# Patient Record
Sex: FEMALE | Race: WHITE | URBAN - METROPOLITAN AREA
[De-identification: names, ages, dates, MRNs, and addresses within clinical notes are randomized per-mention and may not be internally consistent; named-entity substitution may affect disease eponyms.]

---

## 2024-06-03 ENCOUNTER — OFFICE (OUTPATIENT)
Dept: URBAN - METROPOLITAN AREA CLINIC 19 | Facility: CLINIC | Age: 74
End: 2024-06-03
Payer: COMMERCIAL

## 2024-06-03 VITALS
OXYGEN SATURATION: 98 % | HEART RATE: 70 BPM | DIASTOLIC BLOOD PRESSURE: 74 MMHG | WEIGHT: 150 LBS | SYSTOLIC BLOOD PRESSURE: 135 MMHG | HEIGHT: 66 IN

## 2024-06-03 DIAGNOSIS — R19.7 DIARRHEA, UNSPECIFIED: ICD-10-CM

## 2024-06-03 DIAGNOSIS — R10.13 EPIGASTRIC PAIN: ICD-10-CM

## 2024-06-03 DIAGNOSIS — Z79.1 LONG TERM (CURRENT) USE OF NON-STEROIDAL ANTI-INFLAMMATORIES: ICD-10-CM

## 2024-06-03 DIAGNOSIS — R11.0 NAUSEA: ICD-10-CM

## 2024-06-03 PROCEDURE — 99204 OFFICE O/P NEW MOD 45 MIN: CPT

## 2024-06-03 RX ORDER — POLYETHYLENE GLYCOL 3350, SODIUM SULFATE, SODIUM CHLORIDE, POTASSIUM CHLORIDE, ASCORBIC ACID, SODIUM ASCORBATE 140-9-5.2G
KIT ORAL
Qty: 1 | Refills: 0 | Status: ACTIVE
Start: 2024-06-03

## 2024-06-03 NOTE — SERVICEHPINOTES
73-year-old female is here with complaints of nausea.  Reports occasional episodes for several years of nausea without vomiting.  She does have a history of vertigo and states her nausea is sometimes is related to episodes of dizziness.  She does not vape or smoke marijuana.  She is postmenopausal.  Takes Advil multiple times per week for arthritic pain.  Denies any acid reflux or trouble swallowing.  Does not drink any carbonated beverages or sodas or alcohol.  Reports formed bowel movements with occasional loose /pasty stool.  She has not been on any recent antibiotics.  Informs me she had C. diff 12 years ago. She avoids dairy.  Does not have any abdominal pain when she eats red meat.  Complains of mild dull epigastric abdominal pain which is exacerbated after eating. Reports severity 4/10. She has never had an EGD or colonoscopy.  First cousin with colon cancer.  Brother with ulcerative colitis.  No family history of colon polyps.  No cardiac issues, not taking any blood thinners or weight loss medications.  Outside blood work from her PCP's office in April 2024 revealed normal CBC, CMP, TSH, free T4, and magnesium.  Total cholesterol 203, rest of lipid panel was normal.

## 2024-06-11 ENCOUNTER — OFFICE (OUTPATIENT)
Dept: URBAN - METROPOLITAN AREA CLINIC 19 | Facility: CLINIC | Age: 74
End: 2024-06-11

## 2024-06-11 DIAGNOSIS — R11.0 NAUSEA: ICD-10-CM

## 2024-06-11 PROCEDURE — 76700 US EXAM ABDOM COMPLETE: CPT | Mod: TC

## 2024-09-30 PROBLEM — K63.5 POLYP OF COLON: Status: ACTIVE | Noted: 2024-09-30

## 2024-09-30 PROBLEM — K44.9 DIAPHRAGMATIC HERNIA WITHOUT OBSTRUCTION OR GANGRENE: Status: ACTIVE | Noted: 2024-09-30

## 2024-09-30 PROBLEM — K31.89 OTHER DISEASES OF STOMACH AND DUODENUM: Status: ACTIVE | Noted: 2024-09-30

## 2024-09-30 PROBLEM — K62.1 RECTAL POLYP: Status: ACTIVE | Noted: 2024-09-30

## 2025-01-16 ENCOUNTER — OFFICE (OUTPATIENT)
Dept: URBAN - METROPOLITAN AREA CLINIC 19 | Facility: CLINIC | Age: 75
End: 2025-01-16
Payer: MEDICARE

## 2025-01-16 VITALS
SYSTOLIC BLOOD PRESSURE: 141 MMHG | HEART RATE: 109 BPM | HEIGHT: 66 IN | OXYGEN SATURATION: 98 % | DIASTOLIC BLOOD PRESSURE: 91 MMHG | WEIGHT: 159 LBS

## 2025-01-16 DIAGNOSIS — Z86.0100 PERSONAL HISTORY OF COLON POLYPS, UNSPECIFIED: ICD-10-CM

## 2025-01-16 DIAGNOSIS — R11.0 NAUSEA: ICD-10-CM

## 2025-01-16 DIAGNOSIS — R10.13 EPIGASTRIC PAIN: ICD-10-CM

## 2025-01-16 DIAGNOSIS — R19.7 DIARRHEA, UNSPECIFIED: ICD-10-CM

## 2025-01-16 PROCEDURE — 99212 OFFICE O/P EST SF 10 MIN: CPT
